# Patient Record
(demographics unavailable — no encounter records)

---

## 2024-12-17 NOTE — ASSESSMENT
[FreeTextEntry1] : Diagnosis: Prostate Cancer  Plan: PSA today PFPT order placed continue with tadalafil 5 mg daily, will add tadalafil 20 mg 3 x week. Discussed ICI, he will consider.  RTC in 6 months    Tayo Silverio MD, FACS, FRCS  of Urology [facility] Director of Laparoscopic and Robotic Surgery [facility] Director of Urology, [facility] Professor of Urology (Office) [phone] (Cell) [phone] [email or web address]   I performed history/review of imaging, discussed treatment plan with patient, agree with above transcription by ANN.

## 2024-12-17 NOTE — HISTORY OF PRESENT ILLNESS
[FreeTextEntry1] :   CC: Prostate Cancer  Underwent MP radical prostatectomy 7/10/24 pathology= GG 2, N0, pT2  PSA < 0.04 ng/mL on 8/27/24  He is still wearing multiple pull-ups a day. Inconsistent with Kegels He reports about 25-50 % improvement in the urinary control but still with significant ROJAS  Using tadalafil 5 mg  Erections are " semi hard"

## 2024-12-17 NOTE — PHYSICAL EXAM
[General Appearance - Well Developed] : well developed [General Appearance - Well Nourished] : well nourished [Normal Appearance] : normal appearance [Well Groomed] : well groomed [General Appearance - In No Acute Distress] : no acute distress [] : no respiratory distress [Normal Station and Gait] : the gait and station were normal for the patient's age [No Focal Deficits] : no focal deficits [Oriented To Time, Place, And Person] : oriented to person, place, and time